# Patient Record
Sex: FEMALE | Race: WHITE | NOT HISPANIC OR LATINO | Employment: STUDENT | URBAN - NONMETROPOLITAN AREA
[De-identification: names, ages, dates, MRNs, and addresses within clinical notes are randomized per-mention and may not be internally consistent; named-entity substitution may affect disease eponyms.]

---
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
## 2020-08-26 ENCOUNTER — OFFICE VISIT (OUTPATIENT)
Dept: ORTHOPEDIC SURGERY | Facility: CLINIC | Age: 20
End: 2020-08-26

## 2020-08-26 VITALS — BODY MASS INDEX: 25.76 KG/M2 | HEIGHT: 68 IN | RESPIRATION RATE: 18 BRPM | WEIGHT: 170 LBS

## 2020-08-26 DIAGNOSIS — S93.401A SPRAIN OF RIGHT ANKLE, UNSPECIFIED LIGAMENT, INITIAL ENCOUNTER: Primary | ICD-10-CM

## 2020-08-26 DIAGNOSIS — S93.601A FOOT SPRAIN, RIGHT, INITIAL ENCOUNTER: ICD-10-CM

## 2020-08-26 PROCEDURE — 99203 OFFICE O/P NEW LOW 30 MIN: CPT | Performed by: PHYSICIAN ASSISTANT

## 2020-08-26 NOTE — PROGRESS NOTES
Subjective   Patient ID: Moni Costa is a 20 y.o. right hand dominant female  Injury of the Right Ankle (Reports slipping on the stairs on 8/15/20, seen at [facility], diagnosed with sprain and given a boot, she reports pain has decreased some since injury)         History of Present Illness  Patient presents as a new patient with complaints of right ankle pain.  She states on 8/15/2020 she slipped on her stairs.  She was seen at the urgent care diagnosed with a sprain and provided pneumatic boot.  She states as long as she uses the boot she has no pain.  Patient describes pain is intensified by lateral movements of the ankle.    Pain Score: 5  Pain Location: Ankle  Pain Orientation: Right     Pain Descriptors: Aching  Pain Frequency: Intermittent  Pain Onset: Sudden     Clinical Progression: Gradually improving  Aggravating Factors: Walking, Standing        Pain Intervention(s): Rest, Elevated, Cold pack, Home medication  Result of Injury: Yes  Work-Related Injury: No    Past Medical History:   Diagnosis Date   • Anxiety    • Depression    • Diabetes type I (CMS/HCC)         Past Surgical History:   Procedure Laterality Date   • TOOTH EXTRACTION  12/2015       Family History   Problem Relation Age of Onset   • Hypertension Father    • Hypertension Maternal Grandfather    • Hypertension Paternal Grandfather        Social History     Socioeconomic History   • Marital status: Single     Spouse name: Not on file   • Number of children: Not on file   • Years of education: Not on file   • Highest education level: Not on file   Occupational History   • Occupation: student   Tobacco Use   • Smoking status: Never Smoker   • Smokeless tobacco: Never Used   Substance and Sexual Activity   • Alcohol use: No   • Drug use: No   • Sexual activity: Not Currently         Current Outpatient Medications:   •  HUMALOG 100 UNIT/ML injection, USE WITH INSULIN PUMP UP  UNITS PER DAY, Disp: , Rfl:   •  lamoTRIgine (LaMICtal) 25 MG  "tablet, Take 75 mg by mouth Daily., Disp: , Rfl:   •  Multiple Vitamins-Minerals (WOMENS MULTI PO), Take  by mouth., Disp: , Rfl:   •  NOVOLOG 100 UNIT/ML injection, humalog through insulin pump, Disp: , Rfl: 1    No Known Allergies    Review of Systems   Constitutional: Negative for diaphoresis, fever and unexpected weight change.   HENT: Negative for dental problem and sore throat.    Eyes: Negative for visual disturbance.   Respiratory: Negative for shortness of breath.    Cardiovascular: Negative for chest pain.   Gastrointestinal: Negative for abdominal pain, constipation, diarrhea, nausea and vomiting.   Genitourinary: Negative for difficulty urinating and frequency.   Musculoskeletal: Positive for arthralgias (right ankle).   Neurological: Negative for headaches.   Hematological: Does not bruise/bleed easily.       I have reviewed the medical and surgical history, family history, social history, medications, and/or allergies, and the review of systems of this report.    Objective   Resp 18   Ht 172.7 cm (68\")   Wt 77.1 kg (170 lb)   LMP 08/05/2020   BMI 25.85 kg/m²    Physical Exam   Constitutional: She is oriented to person, place, and time. She appears well-developed and well-nourished.   Eyes: Conjunctivae are normal.   Pulmonary/Chest: Effort normal. No respiratory distress.   Musculoskeletal:        Right knee: No tenderness found. No medial joint line and no lateral joint line tenderness noted.        Right ankle: She exhibits no ecchymosis, no deformity, no laceration and normal pulse. Tenderness. Lateral malleolus and AITFL tenderness found. No medial malleolus, no posterior TFL, no head of 5th metatarsal and no proximal fibula tenderness found. Achilles tendon exhibits no pain, no defect and normal Groves's test results.        Right foot: There is tenderness. There is normal capillary refill, no crepitus, no deformity and no laceration.   Neurological: She is alert and oriented to person, " place, and time.   Psychiatric: She has a normal mood and affect.   Nursing note and vitals reviewed.    Ortho Exam   Extremity DVT signs are negative on physical exam with negative Vin sign, no calf pain, no palpable cords and no skin tone change   Neurologic Exam     Mental Status   Oriented to person, place, and time.              Assessment/Plan   Independent Review of Radiographic Studies:    No new imaging done today.     FINAL REPORT     CLINICAL HISTORY:  dorsolateral foot pain since fall. this am, hx arthritis     FINDINGS:  RIGHT ANKLE  3 views of the right ankle were obtained. There is  no acute fracture or dislocation. The mortise is intact.  Visualized joint spaces are normally aligned.  There is minimal  soft tissue swelling overlying the lateral malleolus.     IMPRESSION:  No acute bony abnormality.  RIGHT FOOT  3 views of the foot  ankle were obtained. There is no acute fracture or dislocation.  Visualized joint spaces are normally aligned.  Soft tissues are  unremarkable.  IMPRESSION: No acute bony abnormality.     Authenticated by Maira Bradford MD on 08/15/2020 01:26:03 PM  Ashley Montenegro was seen today for injury.    Diagnoses and all orders for this visit:    Sprain of right ankle, unspecified ligament, initial encounter    Foot sprain, right, initial encounter       Discussion of orthopedic goals  Risk, benefits, and merits of treatment alternatives reviewed with the patient and questions answered  Reduced physical activity as appropriate  Weight bearing parameters reviewed  Avoid offending activity  Ice, heat, and/or modalities as beneficial    Recommendations/Plan:  Patient is encouraged to call or return for any issues or concerns.   Follow-up in 10 days.  Repeat exam and x-ray if no improvement will order MRI.  Continue using the pneumatic boot when weightbearing.  I did recommend the use of heat and ice applications until her next follow-up visit  Patient agreeable to call or  return sooner for any concerns.             EMR Dragon-transcription disclaimer:  This encounter note is an electronic transcription of spoken language to printed text.  Electronic transcription of spoken language may permit erroneous or at times nonsensical words or phrases to be inadvertently transcribed.  Although I have reviewed the note for such errors, some may still exist

## 2020-08-29 PROBLEM — Z01.419 WELL WOMAN EXAM: Status: ACTIVE | Noted: 2020-08-29

## 2020-09-03 ENCOUNTER — OFFICE VISIT (OUTPATIENT)
Dept: OBSTETRICS AND GYNECOLOGY | Facility: CLINIC | Age: 20
End: 2020-09-03

## 2020-09-03 VITALS — WEIGHT: 160 LBS | BODY MASS INDEX: 24.33 KG/M2 | RESPIRATION RATE: 14 BRPM

## 2020-09-03 DIAGNOSIS — Z30.46 ENCOUNTER FOR SURVEILLANCE OF IMPLANTABLE SUBDERMAL CONTRACEPTIVE: Primary | ICD-10-CM

## 2020-09-03 PROBLEM — M21.40 FLAT FOOT: Status: RESOLVED | Noted: 2017-01-11 | Resolved: 2020-09-03

## 2020-09-03 PROBLEM — M25.649: Status: RESOLVED | Noted: 2017-01-11 | Resolved: 2020-09-03

## 2020-09-03 PROCEDURE — 99214 OFFICE O/P EST MOD 30 MIN: CPT | Performed by: OBSTETRICS & GYNECOLOGY

## 2020-09-03 RX ORDER — NITROFURANTOIN 25; 75 MG/1; MG/1
CAPSULE ORAL
COMMUNITY
Start: 2020-09-01 | End: 2021-05-24

## 2020-09-03 NOTE — PROGRESS NOTES
Subjective   Chief Complaint   Patient presents with   • Contraception       Moni Costa is a 20 y.o. year old .  Patient's last menstrual period was 2020.  She presents because of wanting to talk about Nexplanon.  Her biggest concern is that she takes Lamictal and wants to make sure monitor for with birth control effectiveness.  She takes 75 mg daily in total.  Currently only using condoms as birth control.  She is a previous birth control pill taker but had poor compliance problems and therefore will think Nexplanon will be a better choice for her.    The following portions of the patient's history were reviewed and updated as appropriate:current medications, allergies, past family history, past medical history, past social history and past surgical history    Social History    Tobacco Use      Smoking status: Never Smoker      Smokeless tobacco: Never Used         Objective   Resp 14   Wt 72.6 kg (160 lb)   LMP 2020   Breastfeeding No   BMI 24.33 kg/m²     Lab Review   No data reviewed    Imaging   No data reviewed        Assessment   1. Contraceptive counseling  2. BPD - on Lamictal     Plan   1. Explained to Monika that it doses less than 200 mg Lamictal really should have no impact on her contraceptive efficacy.  I cannot say the reverse is not true.  I would encourage her to speak to the physicians prescribing her Lamictal to make sure that they are aware that she is on contraception.  I do not think is can make a big impact ultimately on her treatment for bipolar but I would defer that to their better judgment.  2. The use of condoms for STD prevention was emphasized.  It was explained to Moni that condoms are not a full proof way to eliminate the chance of a sexually transmitted disease.  Ultimately knowing her partner's sexual history is most important.  All of her questions related to condom use were answered.  3. The importance of keeping all planned follow-up and taking  all medications as prescribed was emphasized.  4. Bleeding profiles reviewed  5. Follow up for Nexplanon insertion after menses         This note was electronically signed.    Victor Hugo Casarez M.D.  September 3, 2020    Total time spent today with Moni  was 30 minutes (level 4).  Off this time, 80% was spent face-to-face time coordinating care, answering her questions and counseling regarding pathophysiology of her presenting problem along with plans for any diagnositc work-up and treatment.    Note: Speech recognition transcription software may have been used to create portions of this document.  An attempt at proofreading has been made but errors in transcription could still be present.

## 2020-09-08 ENCOUNTER — OFFICE VISIT (OUTPATIENT)
Dept: ORTHOPEDIC SURGERY | Facility: CLINIC | Age: 20
End: 2020-09-08

## 2020-09-08 VITALS — WEIGHT: 160 LBS | RESPIRATION RATE: 18 BRPM | HEIGHT: 68 IN | BODY MASS INDEX: 24.25 KG/M2

## 2020-09-08 DIAGNOSIS — M76.71 PERONEAL TENDINITIS OF LOWER LEG, RIGHT: ICD-10-CM

## 2020-09-08 DIAGNOSIS — M25.571 ARTHRALGIA OF ANKLE, RIGHT: ICD-10-CM

## 2020-09-08 DIAGNOSIS — S93.401A SPRAIN OF RIGHT ANKLE, UNSPECIFIED LIGAMENT, INITIAL ENCOUNTER: Primary | ICD-10-CM

## 2020-09-08 PROCEDURE — 99213 OFFICE O/P EST LOW 20 MIN: CPT | Performed by: PHYSICIAN ASSISTANT

## 2020-09-08 NOTE — PROGRESS NOTES
Subjective   Patient ID: Moni Costa is a 20 y.o.  female  Follow-up of the Right Ankle (Follow up sprain of right ankle. She has been wearing boot at all times except while showering and sleeping. She states pain is same as last visit, 3-4/10, not improved. )         History of Present Illness  Patient is following up for scheduled appointment regarding injury to the right ankle and foot.  She slipped on stairs 8/15/2020.  She has been wearing a pneumatic boot since 8/15/2020.  She reports her pain has slightly increased.  She has been using ice which does not help with her symptoms.                                                 Past Medical History:   Diagnosis Date   • Bipolar 1 disorder (CMS/[facility]) 2010   • Diabetes type I (CMS/[facility]) 2001        Past Surgical History:   Procedure Laterality Date   • WISDOM TOOTH EXTRACTION  12/2015       Family History   Problem Relation Age of Onset   • Hypertension Father    • Hypertension Maternal Grandfather    • Hypertension Paternal Grandfather        Social History     Socioeconomic History   • Marital status: Single     Spouse name: Not on file   • Number of children: Not on file   • Years of education: Not on file   • Highest education level: Not on file   Occupational History   • Occupation: student   Tobacco Use   • Smoking status: Never Smoker   • Smokeless tobacco: Never Used   Substance and Sexual Activity   • Alcohol use: No   • Drug use: No   • Sexual activity: Not Currently         Current Outpatient Medications:   •  HUMALOG 100 UNIT/ML injection, USE WITH INSULIN PUMP UP  UNITS PER DAY, Disp: , Rfl:   •  lamoTRIgine (LaMICtal) 25 MG tablet, Take 75 mg by mouth Daily., Disp: , Rfl:   •  Multiple Vitamins-Minerals (WOMENS MULTI PO), Take  by mouth., Disp: , Rfl:   •  nitrofurantoin, macrocrystal-monohydrate, (MACROBID) 100 MG capsule, , Disp: , Rfl:     No Known Allergies    Review of Systems   Constitutional: Negative for fever.   HENT: Negative for  "dental problem and voice change.    Eyes: Negative for visual disturbance.   Respiratory: Negative for shortness of breath.    Cardiovascular: Negative for chest pain.   Gastrointestinal: Negative for abdominal pain.   Genitourinary: Negative for dysuria.   Musculoskeletal: Positive for arthralgias and gait problem. Negative for joint swelling.   Skin: Negative for rash.   Neurological: Negative for speech difficulty.   Hematological: Does not bruise/bleed easily.   Psychiatric/Behavioral: Negative for confusion.        I have reviewed the medical and surgical history, family history, social history, medications, and/or allergies, and the review of systems of this report.    Objective   Resp 18   Ht 172.7 cm (68\")   Wt 72.6 kg (160 lb)   BMI 24.33 kg/m²    Physical Exam   Constitutional: She is oriented to person, place, and time. She appears well-developed and well-nourished.   Pulmonary/Chest: Effort normal.   Musculoskeletal:        Right knee: She exhibits no swelling and no ecchymosis. No tenderness found.        Right ankle: She exhibits no ecchymosis and no deformity. Tenderness. Lateral malleolus and AITFL tenderness found. No head of 5th metatarsal and no proximal fibula tenderness found. Achilles tendon exhibits no pain, no defect and normal Groves's test results.        Right foot: There is normal range of motion, no tenderness, no bony tenderness, no swelling, normal capillary refill, no crepitus, no deformity and no laceration.        Neurological: She is alert and oriented to person, place, and time.   Psychiatric: She has a normal mood and affect.   Nursing note and vitals reviewed.    Ortho Exam   Extremity DVT signs are negative on physical exam with negative Vin sign, no calf pain, no palpable cords and no skin tone change   Neurologic Exam     Mental Status   Oriented to person, place, and time.            Assessment/Plan   Independent Review of Radiographic Studies:    No new imaging done " today.  Reviewed imaging with patient at a prior visit.      Procedures       Moni was seen today for follow-up.    Diagnoses and all orders for this visit:    Sprain of right ankle, unspecified ligament, initial encounter  -     Cancel: XR Ankle 3+ View Right; Future  -     MRI Ankle Right Without Contrast    Peroneal tendinitis of lower leg, right  -     MRI Ankle Right Without Contrast    Arthralgia of ankle, right  -     MRI Ankle Right Without Contrast       Orthopedic activities reviewed and patient expressed appreciation  Discussion of orthopedic goals  Risk, benefits, and merits of treatment alternatives reviewed with the patient and questions answered    Recommendations/Plan:  Exercise, medications, injections, other patient advice, and return appointment as noted.  Patient is encouraged to call or return for any issues or concerns.    I do feel an MRI is necessary, considering she is still experiencing pain to the ankle after an injury.  The initial x-rays were inconclusive for fracture  Patient agreeable to call or return sooner for any concerns.  I would like for the patient to continue using topical anti-inflammatories and warm heat.  Continue using the pneumatic boot.           EMR Dragon-transcription disclaimer:  This encounter note is an electronic transcription of spoken language to printed text.  Electronic transcription of spoken language may permit erroneous or at times nonsensical words or phrases to be inadvertently transcribed.  Although I have reviewed the note for such errors, some may still exist

## 2020-09-10 ENCOUNTER — OFFICE VISIT (OUTPATIENT)
Dept: OBSTETRICS AND GYNECOLOGY | Facility: CLINIC | Age: 20
End: 2020-09-10

## 2020-09-10 VITALS — BODY MASS INDEX: 24.94 KG/M2 | RESPIRATION RATE: 14 BRPM | WEIGHT: 164 LBS

## 2020-09-10 DIAGNOSIS — Z30.017 ENCOUNTER FOR INITIAL PRESCRIPTION OF IMPLANTABLE SUBDERMAL CONTRACEPTIVE: Primary | ICD-10-CM

## 2020-09-10 PROCEDURE — 11981 INSERTION DRUG DLVR IMPLANT: CPT | Performed by: OBSTETRICS & GYNECOLOGY

## 2020-09-10 NOTE — PROGRESS NOTES
Nexplanon Insertion    Patient's last menstrual period was 09/03/2020 (approximate).    Date of procedure:  9/10/2020    Risks and benefits discussed? yes  All questions answered? yes  Consents given by the patient  Written consent obtained? yes    Local anesthesia used:  yes - 4 cc's of  Meds; anesthesia local: 1% lidocaine    Procedure documentation:    The upper left arm (non-dominant) was marked at the intended site of insertion.  Betadine was used to cleanse the skin.  Local anesthesia was injected.  The Nexplanon was placed subdermally without difficulty.  The devise was able to be palpated in the arm by both myself and Moni.  Steri-strips were then placed across the site of insertion and the arm was wrapped.    She tolerated the procedure well.  There were no complications.  EBL was minimal.    Post procedure instructions: Remove the wrapping in 24 hours and the steri-strips in 5 days.    Follow up needed: PRN    This note was electronically signed.    Victor Hugo Casarez M.D.  September 10, 2020

## 2021-05-03 ENCOUNTER — TELEPHONE (OUTPATIENT)
Dept: OBSTETRICS AND GYNECOLOGY | Facility: CLINIC | Age: 21
End: 2021-05-03

## 2021-05-24 ENCOUNTER — OFFICE VISIT (OUTPATIENT)
Dept: INTERNAL MEDICINE | Facility: CLINIC | Age: 21
End: 2021-05-24

## 2021-05-24 VITALS
BODY MASS INDEX: 25.31 KG/M2 | WEIGHT: 167 LBS | TEMPERATURE: 98.2 F | OXYGEN SATURATION: 100 % | SYSTOLIC BLOOD PRESSURE: 130 MMHG | HEART RATE: 93 BPM | DIASTOLIC BLOOD PRESSURE: 68 MMHG | HEIGHT: 68 IN

## 2021-05-24 DIAGNOSIS — N92.6 IRREGULAR MENSES: ICD-10-CM

## 2021-05-24 DIAGNOSIS — Z23 NEED FOR PNEUMOCOCCAL VACCINATION: ICD-10-CM

## 2021-05-24 DIAGNOSIS — Z00.00 ANNUAL PHYSICAL EXAM: Primary | ICD-10-CM

## 2021-05-24 DIAGNOSIS — E10.9 TYPE 1 DIABETES MELLITUS WITHOUT COMPLICATION (HCC): ICD-10-CM

## 2021-05-24 DIAGNOSIS — E66.3 OVERWEIGHT WITH BODY MASS INDEX (BMI) OF 25 TO 25.9 IN ADULT: ICD-10-CM

## 2021-05-24 PROCEDURE — 99395 PREV VISIT EST AGE 18-39: CPT | Performed by: PHYSICIAN ASSISTANT

## 2021-05-24 PROCEDURE — 90471 IMMUNIZATION ADMIN: CPT | Performed by: PHYSICIAN ASSISTANT

## 2021-05-24 PROCEDURE — 90732 PPSV23 VACC 2 YRS+ SUBQ/IM: CPT | Performed by: PHYSICIAN ASSISTANT

## 2021-05-24 RX ORDER — VENLAFAXINE HYDROCHLORIDE 37.5 MG/1
CAPSULE, EXTENDED RELEASE ORAL
COMMUNITY
Start: 2021-04-12 | End: 2022-09-21

## 2021-05-24 RX ORDER — ARIPIPRAZOLE 2 MG/1
TABLET ORAL
COMMUNITY
Start: 2021-04-12 | End: 2022-09-21

## 2021-05-24 RX ORDER — DESOGESTREL AND ETHINYL ESTRADIOL 0.15-0.03
1 KIT ORAL DAILY
Qty: 28 TABLET | Refills: 2 | Status: SHIPPED | OUTPATIENT
Start: 2021-05-24 | End: 2021-08-17

## 2021-05-24 NOTE — PROGRESS NOTES
Female Physical Note      Patient Name: Moni Costa  : 2000   MRN: [id number]     Chief Complaint:    Chief Complaint   Patient presents with   • Annual Exam       History of Present Illness: Moni Costa is a 20 y.o. female who is here today for their annual health maintenance and physical.     Bleeding.  Nexplanon was inserted 9/10/2020 and since then menses have been extremely irregular. First month menses lasted longer then none for 5.5 weeks. Then 2.5 weeks of bleeding followed by 6 weeks without bleeding. Since then menses seem to last longer and longer without much time in between. Menses are light but involve cramping. She is not a smoker. No personal or family history of DVT, PE or clotting disorder.     Diabetes type 1 is managed by Dee Valentino with  endocrinology.       Subjective         Past Medical History, Social History, Family History and Care Team were all reviewed with patient and updated as appropriate.         Current Outpatient Medications:   •  ARIPiprazole (ABILIFY) 2 MG tablet, TAKE 1/2 TO 1 TABLET DAILY FOR A STABLE MOOD, Disp: , Rfl:   •  desogestrel-ethinyl estradiol (Apri) 0.15-30 MG-MCG per tablet, Take 1 tablet by mouth Daily., Disp: 28 tablet, Rfl: 2  •  HUMALOG 100 UNIT/ML injection, USE WITH INSULIN PUMP UP  UNITS PER DAY, Disp: , Rfl:   •  Multiple Vitamins-Minerals (WOMENS MULTI PO), Take  by mouth., Disp: , Rfl:   •  venlafaxine XR (EFFEXOR-XR) 37.5 MG 24 hr capsule, TAKE 1 CAPSULE BY MOUTH EVERY DAY TO FIGHT DEPRESSION, Disp: , Rfl:       No Known Allergies    Immunizations:  Td/Tdap (Booster Q 10 yrs): 10/31/2011  Flu (Yearly): 10/2020  Pneumovax (1 yr after Prevnar):  ordered  [id number] (1 yr after Pneumo): n/a  Colorectal Screening:   n/a  Last Completed Colonoscopy     This patient has no relevant Health Maintenance data.         Pap:  Not yet indicated  Last Completed Pap Smear     This patient has no relevant Health Maintenance data.        "  Mammogram:  n/a  Last Completed Mammogram     This patient has no relevant Health Maintenance data.           CT for Smoker (Age 50-80, 20 pk yr): n/a  Bone Density/DEXA: n/a  Hep C screening: n/a    Diet/Physical activity: not following a good diet for the most part, exercising by walking and doing yoga    Sexual Health: active     Depression: PHQ-2 Depression Screening  Little interest or pleasure in doing things? 0   Feeling down, depressed, or hopeless? 0   PHQ-2 Total Score 0     The ASCVD Risk score (Yelena GABRIEL Villar., et al., 2013) failed to calculate for the following reasons:    The 2013 ASCVD risk score is only valid for ages 40 to 79      Intimate partner violence: (Screen on initial visit, pregnant women, women with injuries, older adult with injury or evidence of neglect):  Violence can be a problem in many people's lives, so I now ask every patient about trauma or abuse they may have experienced in a relationship.  • Stress/Safety - Do you feel safe in your relationship? Yes  • Afraid/Abused - Have you ever been in a relationship where you were threatened, hurt, or afraid? No  • Friend/Family - Are your friends aware you have been hurt? N/A  • Emergency Plan - Do you have a safe place to go and the resources you need in an emergency? Yes    Osteoporosis screening:   • Menopausal women < 65 with RF (advancing age, previous fracture, glucocorticoid therapy, parental hip fracture, low body weight, current cigarette smoking, excessive alcohol consumption, rheumatoid arthritis, secondary osteoporosis [hypogonadism/premature menopause, malabsorption, chronic liver disease, IBD]).  • All women 65 or older.    Objective     Physical Exam:  Vital Signs:   Vitals:    05/24/21 1348   BP: 130/68   Pulse: 93   Temp: 98.2 °F (36.8 °C)   SpO2: 100%   Weight: 75.8 kg (167 lb)   Height: 172.7 cm (67.99\")     Body mass index is 25.4 kg/m².     Physical Exam  Vitals and nursing note reviewed.   Constitutional:       General: " She is awake. She is not in acute distress.     Appearance: She is well-developed and overweight. She is not ill-appearing, toxic-appearing or diaphoretic.      Interventions: Face mask in place.   HENT:      Head: Normocephalic and atraumatic.      Right Ear: Tympanic membrane, ear canal and external ear normal. There is no impacted cerumen.      Left Ear: Tympanic membrane, ear canal and external ear normal. There is no impacted cerumen.      Mouth/Throat:      Mouth: Mucous membranes are moist.      Pharynx: No oropharyngeal exudate or posterior oropharyngeal erythema.   Eyes:      General: No scleral icterus.     Conjunctiva/sclera: Conjunctivae normal.      Pupils: Pupils are equal, round, and reactive to light.   Neck:      Thyroid: No thyromegaly.   Cardiovascular:      Rate and Rhythm: Normal rate and regular rhythm.      Heart sounds: Normal heart sounds. No murmur heard.   No friction rub. No gallop.    Pulmonary:      Effort: Pulmonary effort is normal. No respiratory distress.      Breath sounds: Normal breath sounds. No wheezing, rhonchi or rales.   Chest:      Chest wall: No tenderness.   Abdominal:      General: Bowel sounds are normal. There is no distension.      Palpations: Abdomen is soft. There is no mass.      Tenderness: There is no abdominal tenderness. There is no right CVA tenderness, left CVA tenderness, guarding or rebound.   Musculoskeletal:         General: No tenderness or deformity. Normal range of motion.      Cervical back: Normal range of motion and neck supple. No rigidity or tenderness.   Lymphadenopathy:      Cervical: No cervical adenopathy.   Skin:     General: Skin is warm and dry.      Capillary Refill: Capillary refill takes less than 2 seconds.      Coloration: Skin is not jaundiced or pale.      Findings: No erythema or rash.   Neurological:      General: No focal deficit present.      Mental Status: She is alert and oriented to person, place, and time.      Cranial  Nerves: No cranial nerve deficit.      Sensory: No sensory deficit.      Gait: Gait normal.      Deep Tendon Reflexes: Reflexes normal.   Psychiatric:         Mood and Affect: Mood normal.         Behavior: Behavior normal. Behavior is cooperative.         Thought Content: Thought content normal.         Judgment: Judgment normal.         Procedures    Assessment / Plan      Assessment/Plan:   Diagnoses and all orders for this visit:    1. Annual physical exam (Primary)    2. Overweight with body mass index (BMI) of 25 to 25.9 in adult  Patient's Body mass index is 25.4 kg/m². indicating that she is overweight (BMI 25-29.9). Obesity-related health conditions include the following: none. Obesity is newly identified. BMI is is above average; BMI management plan is completed. We discussed portion control and increasing exercise..    3. Irregular menses  -     Begin: Desogestrel-ethinyl estradiol (Apri) 0.15-30 MG-MCG per tablet; Take 1 tablet by mouth Daily.  Dispense: 28 tablet; Refill: 2  We will treat with 2 or 3 months of OCP in attempt to regulate menses.  She will follow-up with her gynecologist in 2 to 3 months.    Discussed risks, benefits and potential side effects of medication.  Discussed proper administration of medication as well as what to do if doses are missed. Discussed the dangers of smoking in combination with oral contraceptive use and she voiced understanding. Discussed that oral contraceptives are not 100% effective in preventing pregnancy and do not prevent ANY sexually transmitted diseases.      4. Need for pneumococcal vaccination  -     Pneumococcal Polysaccharide Vaccine 23-Valent Greater Than or Equal To 1yo Subcutaneous / IM  We discussed the risks and benefits of Pneumovax 23 (Pneumococcal).    Counseling was given to inform of the potential signs and symptoms of adverse effects and when to seek medical attention.    The CDC Vaccination Information Sheet (VIS) was given for review prior to  administration.  A copy of the VIS was also offered.      5. Type 1 diabetes mellitus without complication (CMS/HCC)  -     Pneumococcal Polysaccharide Vaccine 23-Valent Greater Than or Equal To 1yo Subcutaneous / IM          Follow Up:   Return in about 1 year (around 5/24/2022) for Annual physical.    Healthcare Maintenance:   --Nutrition: Stressed importance of moderation in sodium/caffeine intake, saturated fat and cholesterol, caloric balance, sufficient intake of fresh fruits, vegetables, fiber, calcium, iron, and 1 g folate supplementation if of childbearing age.   --Discussed the issue of calcium supplement, and the daily use of baby aspirin if applicable.             --Mammogram recommended every 2 years from age 40-49 and yearly beginning at age 50.  --Exercise: Stressed the importance of regular exercise.   --Substance Abuse: Discussed cessation/primary prevention of tobacco (if applicable), alcohol, or other drug use (if applicable); driving or other dangerous activities under the influence; availability of treatment for abuse.    --Sexuality: Discussed sexually transmitted diseases, partner selection, use of condoms, avoidance of unintended pregnancy  and contraceptive alternatives.   --Injury prevention: Discussed safety belts, safety helmets, smoke detector, smoking near bedding or upholstery.   --Dental health: Discussed importance of regular tooth brushing, flossing, and dental visits every 6 months.  --Immunizations reviewed.  --Discussed benefits of screening colonoscopy (if applicable).  --After hours service discussed with patient.    Moni Costa voices understanding and acceptance of this advice and will call back with any further questions or concerns. AVS with preventive healthcare tips printed for patient.     Ana Menendez PA-C  Primary Care [facility]

## 2021-08-10 DIAGNOSIS — N92.6 IRREGULAR MENSES: ICD-10-CM

## 2021-08-10 RX ORDER — DESOGESTREL AND ETHINYL ESTRADIOL 0.15-0.03
KIT ORAL
Qty: 84 TABLET | OUTPATIENT
Start: 2021-08-10

## 2021-08-17 ENCOUNTER — OFFICE VISIT (OUTPATIENT)
Dept: INTERNAL MEDICINE | Facility: CLINIC | Age: 21
End: 2021-08-17

## 2021-08-17 VITALS
DIASTOLIC BLOOD PRESSURE: 72 MMHG | HEART RATE: 116 BPM | BODY MASS INDEX: 25.31 KG/M2 | WEIGHT: 167 LBS | HEIGHT: 68 IN | TEMPERATURE: 97.5 F | OXYGEN SATURATION: 98 % | SYSTOLIC BLOOD PRESSURE: 110 MMHG

## 2021-08-17 DIAGNOSIS — L42 PITYRIASIS ROSEA: Primary | ICD-10-CM

## 2021-08-17 LAB — HBA1C MFR BLD: 7.8 %

## 2021-08-17 PROCEDURE — 99213 OFFICE O/P EST LOW 20 MIN: CPT | Performed by: PHYSICIAN ASSISTANT

## 2021-08-17 RX ORDER — IBUPROFEN 600 MG/1
TABLET ORAL SEE ADMIN INSTRUCTIONS
COMMUNITY
Start: 2021-07-15

## 2021-08-17 RX ORDER — HYDROXYZINE HYDROCHLORIDE 25 MG/1
TABLET, FILM COATED ORAL
COMMUNITY
Start: 2021-07-16 | End: 2021-08-17

## 2021-08-17 NOTE — PROGRESS NOTES
"     Follow Up Office Visit      Patient Name: Moni Costa  : 2000   MRN: [id number]     Chief Complaint:    Chief Complaint   Patient presents with   • Rash       History of Present Illness: Moni Costa is a 21 y.o. female who is here today with concern of rash. She had 1 area of rash on the left posterior neck beginning around 1 month ago which then spread to around 20 spots. The rash was red at first but has faded in the last week. The rash has never been itchy but appears dry with little scaling. She has not tried anything for treatment. No previous similar occurrences.           Subjective      I have reviewed and the following portions of the patient's history were updated as appropriate: past family history, past medical history, past social history, past surgical history and problem list.      Current Outpatient Medications:   •  ARIPiprazole (ABILIFY) 2 MG tablet, TAKE 1/2 TO 1 TABLET DAILY FOR A STABLE MOOD, Disp: , Rfl:   •  Glucagon, rDNA, (Glucagon Emergency) 1 MG kit, See Admin Instructions., Disp: , Rfl:   •  HUMALOG 100 UNIT/ML injection, USE WITH INSULIN PUMP UP  UNITS PER DAY, Disp: , Rfl:   •  Multiple Vitamins-Minerals (WOMENS MULTI PO), Take  by mouth., Disp: , Rfl:   •  venlafaxine XR (EFFEXOR-XR) 37.5 MG 24 hr capsule, TAKE 1 CAPSULE BY MOUTH EVERY DAY TO FIGHT DEPRESSION, Disp: , Rfl:     No Known Allergies    Objective     Physical Exam:  Vital Signs:   Vitals:    21 1044   BP: 110/72   Pulse: 116   Temp: 97.5 °F (36.4 °C)   SpO2: 98%   Weight: 75.8 kg (167 lb)   Height: 172.7 cm (67.99\")     Body mass index is 25.4 kg/m².    Physical Exam  Vitals and nursing note reviewed.   Constitutional:       General: She is not in acute distress.     Appearance: She is well-developed. She is not ill-appearing, toxic-appearing or diaphoretic.   HENT:      Head: Normocephalic and atraumatic.      Right Ear: External ear normal.      Left Ear: External ear normal.   Eyes:     "  General: No scleral icterus.     Extraocular Movements: Extraocular movements intact.      Conjunctiva/sclera: Conjunctivae normal.      Pupils: Pupils are equal, round, and reactive to light.   Cardiovascular:      Rate and Rhythm: Normal rate and regular rhythm.      Heart sounds: Normal heart sounds. No murmur heard.   No friction rub. No gallop.    Pulmonary:      Effort: Pulmonary effort is normal. No respiratory distress.      Breath sounds: Normal breath sounds. No wheezing, rhonchi or rales.   Chest:      Chest wall: No tenderness.   Abdominal:      General: Bowel sounds are normal.      Palpations: Abdomen is soft.      Tenderness: There is no abdominal tenderness.   Musculoskeletal:         General: No tenderness or deformity. Normal range of motion.      Cervical back: Normal range of motion and neck supple.      Right lower leg: No edema.      Left lower leg: No edema.   Skin:     General: Skin is warm and dry.      Capillary Refill: Capillary refill takes less than 2 seconds.      Coloration: Skin is not jaundiced or pale.      Findings: Rash (oval mildly erythematous, scaly patches with raised borders scatteres over neck, upper back, chest and trunk with single larger herald patch on left posterior neck.) present. Rash is scaling.          Neurological:      Mental Status: She is alert and oriented to person, place, and time.      Cranial Nerves: No cranial nerve deficit.      Sensory: No sensory deficit.      Motor: No abnormal muscle tone.      Coordination: Coordination normal.      Deep Tendon Reflexes: Reflexes normal.   Psychiatric:         Mood and Affect: Mood normal.         Behavior: Behavior normal.         Thought Content: Thought content normal.         Judgment: Judgment normal.         Common labs    Common Labsle 6/4/21   Hemoglobin A1C 7.8      Comments are available for some flowsheets but are not being displayed.               Assessment / Plan      Assessment/Plan:   Diagnoses and  all orders for this visit:    1. Pityriasis rosea (Primary)    Discussed diagnosis and provided written documentation regarding clinical course. No treatment necessary as it is self-limited. If symptoms worsen or persist beyond 8-10 weeks she will let me know.         I spent 21 minutes caring for Moni on this date of service. This time includes time spent by me in the following activities:preparing for the visit, performing a medically appropriate examination and/or evaluation , counseling and educating the patient/family/caregiver and documenting information in the medical record    Follow Up:   Return if symptoms worsen or fail to improve.    Patient was given instructions and counseling regarding her condition or for health maintenance advice. Please see specific information pulled into the AVS if appropriate.     Ana Menendez PA-C  Primary Care [facility]     Please note that portions of this note may have been completed with a voice recognition program. Efforts were made to edit the dictations, but occasionally words are mistranscribed.

## 2021-10-29 ENCOUNTER — OFFICE VISIT (OUTPATIENT)
Dept: OBSTETRICS AND GYNECOLOGY | Facility: CLINIC | Age: 21
End: 2021-10-29

## 2021-10-29 VITALS — WEIGHT: 178 LBS | RESPIRATION RATE: 14 BRPM | BODY MASS INDEX: 27.07 KG/M2

## 2021-10-29 DIAGNOSIS — N92.6 IRREGULAR MENSES: ICD-10-CM

## 2021-10-29 DIAGNOSIS — Z30.46 ENCOUNTER FOR SURVEILLANCE OF IMPLANTABLE SUBDERMAL CONTRACEPTIVE: Primary | ICD-10-CM

## 2021-10-29 PROCEDURE — 99213 OFFICE O/P EST LOW 20 MIN: CPT | Performed by: OBSTETRICS & GYNECOLOGY

## 2021-10-29 NOTE — PROGRESS NOTES
Subjective   Chief Complaint   Patient presents with   • Contraception       Moni Costa is a 21 y.o. year old .  No LMP recorded.  She comes today to talk about placement of Nexplanon and wants to consider Mirena as alternative birth control.  The issues she has with the Nexplanon has been changing her menstrual cycle.  They have become unpredictable and long.  Prior to the placement of Nexplanon they were much more regular and much lighter.  She is sexually monogamous.  She really knows very little about Kyleena.    The following portions of the patient's history were reviewed and updated as appropriate:current medications and allergies    Social History    Tobacco Use      Smoking status: Never Smoker      Smokeless tobacco: Never Used         Objective   Resp 14   Wt 80.7 kg (178 lb)   Breastfeeding No   BMI 27.07 kg/m²     Lab Review   No data reviewed    Imaging   No data reviewed        Assessment   1. Irregular menses most likely related to dysfunctional bleeding from Nexplanon.  Desires removal and initiation of alternative birth control methods     Plan   1. At this point I would advocate for Nexplanon to be removed only after all effective contraception has been initiated  2. From an IUD standpoint I favor Kyleena given its size and her nulliparous status  3. The importance of keeping all planned follow-up and taking all medications as prescribed was emphasized.  4. Follow up placement of Kyleena          This note was electronically signed.    Victor Hugo Casarez M.D.  2021    Total time spent today with Moni  was 20-29 minutes (level 3) - contraceptive choices and efficacy rates.    Note: Speech recognition transcription software may have been used to create portions of this document.  An attempt at proofreading has been made but errors in transcription could still be present.

## 2021-12-05 PROBLEM — Z97.5 IUD (INTRAUTERINE DEVICE) IN PLACE: Status: ACTIVE | Noted: 2021-12-05

## 2021-12-05 NOTE — PROGRESS NOTES
IUD Insertion    No LMP recorded.    Date of procedure:  12/5/2021    Risks and benefits discussed? yes  All questions answered? yes  Consents given by The patient  Written consent obtained? yes    Local anesthesia used:  no    Procedure documentation:    After verifying the patient had a low probability of being pregnant and met the criteria for insertion, a sterile speculum has placed and the cervix was cleansed with an antiseptic solution.  Vaginal discharge was scant.  The anterior lip of the cervix was grasped with a tenaculum and the uterine cavity was gently sounded. There was no difficulty passing the sound through the cervix.  Cervical dilation did not need to be performed prior to placing the IUD.  The uterus was anteverted and sounded to 8 cms.  The Kyleena was then prepared per the manufacturers instructions.    The Kyleena was advanced to a point 2 cms from the fundus and then the arms were released from the sheath.  The device was advanced to the fundus and the device was released fully from the sheath. The string was cut 2 cms in length.  Bleeding from the cervix was scant.    She tolerated the procedure without any difficulty.     Post procedure instructions: It was reviewed that the Kyleena will not alter the timing of when she bleeds but it may decrease the quantity of flow and cramps.  Many woman will be amenorrheic over time.  Efficacy rate of 99.2% over 5 years was discussed.  Spontaneous expulsion rate of 1-2% was also discussed.  If she has any issue with fever or excessive bleeding or pain she is to call the office immediately.  Otherwise I would like to see her back in 6 weeks with an ultrasound to confirm correct placement.    This note was electronically signed.    Victor Hugo Casarez M.D.  December 5, 2021

## 2021-12-07 ENCOUNTER — OFFICE VISIT (OUTPATIENT)
Dept: OBSTETRICS AND GYNECOLOGY | Facility: CLINIC | Age: 21
End: 2021-12-07

## 2021-12-07 VITALS
RESPIRATION RATE: 14 BRPM | BODY MASS INDEX: 27.38 KG/M2 | WEIGHT: 180 LBS | SYSTOLIC BLOOD PRESSURE: 116 MMHG | DIASTOLIC BLOOD PRESSURE: 70 MMHG

## 2021-12-07 DIAGNOSIS — Z30.430 ENCOUNTER FOR INTRAUTERINE DEVICE PLACEMENT: Primary | ICD-10-CM

## 2021-12-07 DIAGNOSIS — Z30.431 CHECKING OF INTRAUTERINE DEVICE: ICD-10-CM

## 2021-12-07 PROCEDURE — 58300 INSERT INTRAUTERINE DEVICE: CPT | Performed by: OBSTETRICS & GYNECOLOGY

## 2021-12-07 RX ORDER — MODAFINIL 200 MG/1
TABLET ORAL
COMMUNITY
Start: 2021-11-30 | End: 2021-12-10

## 2021-12-07 RX ORDER — LEVONORGESTREL 19.5 MG/1
1 INTRAUTERINE DEVICE INTRAUTERINE
Start: 2021-12-07 | End: 2026-12-06

## 2022-01-24 ENCOUNTER — OFFICE VISIT (OUTPATIENT)
Dept: OBSTETRICS AND GYNECOLOGY | Facility: CLINIC | Age: 22
End: 2022-01-24

## 2022-01-24 VITALS — BODY MASS INDEX: 26.91 KG/M2 | WEIGHT: 177 LBS | RESPIRATION RATE: 14 BRPM

## 2022-01-24 DIAGNOSIS — Z30.46 ENCOUNTER FOR NEXPLANON REMOVAL: Primary | ICD-10-CM

## 2022-01-24 PROBLEM — Z30.017 ENCOUNTER FOR INITIAL PRESCRIPTION OF IMPLANTABLE SUBDERMAL CONTRACEPTIVE: Status: RESOLVED | Noted: 2020-09-10 | Resolved: 2022-01-24

## 2022-01-24 PROCEDURE — 11982 REMOVE DRUG IMPLANT DEVICE: CPT | Performed by: OBSTETRICS & GYNECOLOGY

## 2022-01-24 NOTE — PROGRESS NOTES
Nexplanon Removal    Date of procedure:  1/24/2022    Risks and benefits discussed? yes  All questions answered? yes  Consents given by the patient  Written consent obtained? yes    Local anesthesia used:  yes - 4 cc's of  Meds; anesthesia local: 1% lidocaine    Procedure documentation:    The upper left arm (non-dominant) was marked at the intended site of removal.  Betadine was used to cleanse the skin.  Local anesthesia was injected.  A vertical incision was created at the distal tip of the implant.  The implant was removed intact without difficulty.  Steri-strips were then placed across the site of insertion and the arm was wrapped.    She tolerated the procedure well.  There were no complications.  EBL was minimal.    Post procedure instructions: Remove the wrapping in 24 hours and the steri-strips in 5 days.    Follow up needed: PRN    This note was electronically signed.    Victor Hugo Casarez M.D.  January 24, 2022